# Patient Record
Sex: MALE | Race: ASIAN | NOT HISPANIC OR LATINO | URBAN - METROPOLITAN AREA
[De-identification: names, ages, dates, MRNs, and addresses within clinical notes are randomized per-mention and may not be internally consistent; named-entity substitution may affect disease eponyms.]

---

## 2021-09-30 ENCOUNTER — OUTPATIENT (OUTPATIENT)
Dept: OUTPATIENT SERVICES | Facility: HOSPITAL | Age: 76
LOS: 1 days | End: 2021-09-30
Payer: MEDICAID

## 2021-09-30 ENCOUNTER — APPOINTMENT (OUTPATIENT)
Dept: UROLOGY | Facility: CLINIC | Age: 76
End: 2021-09-30
Payer: MEDICAID

## 2021-09-30 DIAGNOSIS — Z78.9 OTHER SPECIFIED HEALTH STATUS: ICD-10-CM

## 2021-09-30 PROCEDURE — 76775 US EXAM ABDO BACK WALL LIM: CPT | Mod: 26

## 2021-09-30 PROCEDURE — 52000 CYSTOURETHROSCOPY: CPT

## 2021-09-30 PROCEDURE — 99204 OFFICE O/P NEW MOD 45 MIN: CPT | Mod: 25

## 2021-09-30 PROCEDURE — 76775 US EXAM ABDO BACK WALL LIM: CPT

## 2021-09-30 RX ORDER — FINASTERIDE 5 MG/1
TABLET, FILM COATED ORAL
Refills: 0 | Status: ACTIVE | COMMUNITY

## 2021-09-30 RX ORDER — ATORVASTATIN CALCIUM 80 MG/1
TABLET, FILM COATED ORAL
Refills: 0 | Status: ACTIVE | COMMUNITY

## 2021-09-30 RX ORDER — TAMSULOSIN HCL 0.4 MG
CAPSULE ORAL
Refills: 0 | Status: ACTIVE | COMMUNITY

## 2021-09-30 RX ORDER — DOCUSATE SODIUM 100 MG/1
100 CAPSULE ORAL
Refills: 0 | Status: ACTIVE | COMMUNITY

## 2021-09-30 RX ORDER — INSULIN LISPRO 100 [IU]/ML
INJECTION, SOLUTION INTRAVENOUS; SUBCUTANEOUS
Refills: 0 | Status: ACTIVE | COMMUNITY

## 2021-09-30 NOTE — HISTORY OF PRESENT ILLNESS
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

## 2021-09-30 NOTE — LETTER BODY
[FreeTextEntry1] : Farhad Barrera MD\par [address] [facility] [address], \[place] [address]\par [phone]\par \par Dear Dr. Barrera, \par \par Reason for visit: Gross hematuria.\par \par This is a 75 year-old Mandarin-speaking man on oxygen with stage III chronic renal insufficiency presenting with gross hematuria referred for evaluation. He is accompanied by his family member. The patient was recently hospitalized for inflammatory pneumonia. He underwent a lung biopsy.  He notes gross hematuria for the past 24 hours with no improvement. He denies any dysuria or urinary incontinence. The patient denies any aggravating or relieving factors. The patient denies any interference of function. The patient is entirely asymptomatic. All other review of systems are negative. He has no cancer in his family medical history. He has previous surgical history. Past medical history, family history and social history were inquired and were noncontributory to current condition. The patient does not use tobacco or drink alcohol. Medications and allergies were reviewed. He has no known allergies to medication. \par \par On examination, the patient is an elderly-appearing oxygen-dependent gentleman in no acute distress. He is alert and oriented follows commands. He has normal mood and affect. He is normocephalic. Neck is supple. Respirations are unlabored. His abdomen is soft and nontender. Bladder is nonpalpable. No CVA tenderness. Neurologically he is grossly intact. No peripheral edema. Skin without gross abnormality. He has normal male external genitalia. Normal meatus. Bilateral testes are descended intrascrotally and normal to palpation. On rectal examination, there is normal sphincter tone. The prostate is clinically benign without focal induration or nodularity.\par \par His BMP demonstrated decreased renal function, creatinine 1.36. \par \par Assessment: Gross hematuria.\par \par I counseled the patient on the various etiologies of gross hematuria. I discussed the risk of occult malignancy. He will also undergo renal ultrasound and cystoscopy to evaluate for hematuria.  I recommended patient obtain urinalysis and urine cytology. I counseled the patient about the procedures. I answered the patient's questions. The risks and expected outcomes were discussed.\par \par Plan: Cystoscopy. Renal ultrasound. Urinalysis. Urine cytology. \par \par His hematuria evaluation today demonstrated a bladder tumor on the left bladder trigone. \par \par I personally reviewed ultrasound images with the patient today and images demonstrated bilateral simple cysts. The largest cyst on the right measures 5.6 cm x 7.1 cm x 7.1 cm in the lateral upper pole. The largest cyst on the left measures 5.2 cm x 3.8 cm x 3.3 cm in the lateral upper pole. Both kidneys are normal in size and echogenicity without obvious stones, hydronephrosis or solid masses visualized. \par \par Assessment: Bladder tumor. \par \par I counseled the patient. His hematuria evaluation today demonstrated a bladder tumor on the left bladder trigone. I discussed with the patient the clinical significance of this finding.  I recommended the patient undergo TURBT. I counseled the patient regarding the procedure. The risks and benefits were discussed. Alternatives were given. I answered the patient questions. The patient will take the necessary preparations for the procedure. The patient will obtain preoperative labs including, BMP, CBC w/ DIFF, culture. Risks and alternatives were discussed. I answered the patient questions. The patient will follow-up as directed and will contact me with any questions or concerns. Thank you for the opportunity to participate in the care of Mr. AGUILAR. I will keep you updated on his progress. \par \par Plan: TURBT. Preoperative labs. Follow up as directed.

## 2021-10-02 LAB — BACTERIA UR CULT: NORMAL

## 2021-10-04 ENCOUNTER — NON-APPOINTMENT (OUTPATIENT)
Age: 76
End: 2021-10-04

## 2021-10-06 DIAGNOSIS — N18.30 CHRONIC KIDNEY DISEASE, STAGE 3 UNSPECIFIED: ICD-10-CM

## 2021-10-06 DIAGNOSIS — Z99.81 DEPENDENCE ON SUPPLEMENTAL OXYGEN: ICD-10-CM

## 2021-10-06 DIAGNOSIS — Z00.00 ENCOUNTER FOR GENERAL ADULT MEDICAL EXAMINATION WITHOUT ABNORMAL FINDINGS: ICD-10-CM

## 2021-10-06 DIAGNOSIS — R31.0 GROSS HEMATURIA: ICD-10-CM

## 2021-10-06 DIAGNOSIS — Z78.9 OTHER SPECIFIED HEALTH STATUS: ICD-10-CM

## 2021-10-06 DIAGNOSIS — Z87.01 PERSONAL HISTORY OF PNEUMONIA (RECURRENT): ICD-10-CM

## 2021-10-07 ENCOUNTER — OUTPATIENT (OUTPATIENT)
Dept: OUTPATIENT SERVICES | Facility: HOSPITAL | Age: 76
LOS: 1 days | End: 2021-10-07
Payer: MEDICAID

## 2021-10-07 VITALS
WEIGHT: 147.71 LBS | HEART RATE: 82 BPM | RESPIRATION RATE: 16 BRPM | HEIGHT: 68 IN | SYSTOLIC BLOOD PRESSURE: 123 MMHG | OXYGEN SATURATION: 97 % | DIASTOLIC BLOOD PRESSURE: 68 MMHG | TEMPERATURE: 97 F

## 2021-10-07 DIAGNOSIS — R31.0 GROSS HEMATURIA: ICD-10-CM

## 2021-10-07 DIAGNOSIS — Z00.00 ENCOUNTER FOR GENERAL ADULT MEDICAL EXAMINATION WITHOUT ABNORMAL FINDINGS: ICD-10-CM

## 2021-10-07 DIAGNOSIS — Z98.890 OTHER SPECIFIED POSTPROCEDURAL STATES: Chronic | ICD-10-CM

## 2021-10-07 DIAGNOSIS — N18.30 CHRONIC KIDNEY DISEASE, STAGE 3 UNSPECIFIED: ICD-10-CM

## 2021-10-07 DIAGNOSIS — E11.9 TYPE 2 DIABETES MELLITUS WITHOUT COMPLICATIONS: ICD-10-CM

## 2021-10-07 DIAGNOSIS — Z01.818 ENCOUNTER FOR OTHER PREPROCEDURAL EXAMINATION: ICD-10-CM

## 2021-10-07 DIAGNOSIS — J84.89 OTHER SPECIFIED INTERSTITIAL PULMONARY DISEASES: ICD-10-CM

## 2021-10-07 DIAGNOSIS — Z90.49 ACQUIRED ABSENCE OF OTHER SPECIFIED PARTS OF DIGESTIVE TRACT: Chronic | ICD-10-CM

## 2021-10-07 DIAGNOSIS — I10 ESSENTIAL (PRIMARY) HYPERTENSION: ICD-10-CM

## 2021-10-07 DIAGNOSIS — Z87.01 PERSONAL HISTORY OF PNEUMONIA (RECURRENT): ICD-10-CM

## 2021-10-07 LAB
ANION GAP SERPL CALC-SCNC: 11 MMOL/L — SIGNIFICANT CHANGE UP (ref 5–17)
BUN SERPL-MCNC: 50 MG/DL — HIGH (ref 7–23)
CALCIUM SERPL-MCNC: 8.8 MG/DL — SIGNIFICANT CHANGE UP (ref 8.4–10.5)
CHLORIDE SERPL-SCNC: 105 MMOL/L — SIGNIFICANT CHANGE UP (ref 96–108)
CO2 SERPL-SCNC: 20 MMOL/L — LOW (ref 22–31)
CREAT SERPL-MCNC: 1.63 MG/DL — HIGH (ref 0.5–1.3)
GLUCOSE SERPL-MCNC: 99 MG/DL — SIGNIFICANT CHANGE UP (ref 70–99)
HCT VFR BLD CALC: 33.7 % — LOW (ref 39–50)
HGB BLD-MCNC: 10.9 G/DL — LOW (ref 13–17)
MCHC RBC-ENTMCNC: 31.3 PG — SIGNIFICANT CHANGE UP (ref 27–34)
MCHC RBC-ENTMCNC: 32.3 GM/DL — SIGNIFICANT CHANGE UP (ref 32–36)
MCV RBC AUTO: 96.8 FL — SIGNIFICANT CHANGE UP (ref 80–100)
NRBC # BLD: 0 /100 WBCS — SIGNIFICANT CHANGE UP (ref 0–0)
PLATELET # BLD AUTO: 100 K/UL — LOW (ref 150–400)
POTASSIUM SERPL-MCNC: 5.2 MMOL/L — SIGNIFICANT CHANGE UP (ref 3.5–5.3)
POTASSIUM SERPL-SCNC: 5.2 MMOL/L — SIGNIFICANT CHANGE UP (ref 3.5–5.3)
RBC # BLD: 3.48 M/UL — LOW (ref 4.2–5.8)
RBC # FLD: 16.4 % — HIGH (ref 10.3–14.5)
SODIUM SERPL-SCNC: 136 MMOL/L — SIGNIFICANT CHANGE UP (ref 135–145)
WBC # BLD: 7.9 K/UL — SIGNIFICANT CHANGE UP (ref 3.8–10.5)
WBC # FLD AUTO: 7.9 K/UL — SIGNIFICANT CHANGE UP (ref 3.8–10.5)

## 2021-10-07 PROCEDURE — 80048 BASIC METABOLIC PNL TOTAL CA: CPT

## 2021-10-07 PROCEDURE — 87086 URINE CULTURE/COLONY COUNT: CPT

## 2021-10-07 PROCEDURE — G0463: CPT

## 2021-10-07 PROCEDURE — 85027 COMPLETE CBC AUTOMATED: CPT

## 2021-10-07 RX ORDER — SODIUM CHLORIDE 9 MG/ML
3 INJECTION INTRAMUSCULAR; INTRAVENOUS; SUBCUTANEOUS EVERY 8 HOURS
Refills: 0 | Status: DISCONTINUED | OUTPATIENT
Start: 2021-10-20 | End: 2021-11-03

## 2021-10-07 RX ORDER — LIDOCAINE HCL 20 MG/ML
0.2 VIAL (ML) INJECTION ONCE
Refills: 0 | Status: DISCONTINUED | OUTPATIENT
Start: 2021-10-20 | End: 2021-11-03

## 2021-10-07 RX ORDER — CEFAZOLIN SODIUM 1 G
2000 VIAL (EA) INJECTION ONCE
Refills: 0 | Status: DISCONTINUED | OUTPATIENT
Start: 2021-10-20 | End: 2021-11-03

## 2021-10-07 NOTE — H&P PST ADULT - ATTENDING COMMENTS
Patient with bladder lesion. Patient wishes to proceed with TURBT. Informed consent was obtained. Alternatives were given. Risks including but not limited to bleeding, infection, risk of anesthesia, pain, failure to cure, need for future procedure, and injury to surrounding structures were discussed. Patient wishes to proceed with procedure.

## 2021-10-07 NOTE — H&P PST ADULT - HISTORY OF PRESENT ILLNESS
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 Pt reports of gross hematuria for couple of weeks and denies any dysuria or urinary incontinence. history, family history and social history were inquired and were noncontributory to current condition. The patient does not use tobacco or drink alcohol. Medications and allergies were reviewed.    ****Of note Pt is currently on 2 L nasal cannula just for THOMAS, as per pt daughter, pulmonary appt and chest xray is on 10/14/21. Pt denies shortness of breath now  Denies Recent travel, Exposure or Covid symptoms  Covid PCR test on 10/17/21 75 year-old Mandarin-speaking man HTN, HLD,CKD-Stage3,Steroid induced diabetes- last AIC is 6.7 % on 09/2021, Hilar adenopathy, Organizing Pneumonia-on [id number]---[place] hospitalized @ [facility] 09/07/2021 for cough, fever and progressive SOB, s/p bronchoscopy and EBUS biopsy revealed no malignancy and showed organizing PNA, presented to PST for pre op clearance prior to Transurethral resection of bladder tumor on 10/20/21. Pt reports of gross hematuria for couple of weeks and denies any dysuria or urinary incontinence.     ****Of note Pt is currently on 2 L nasal cannula just for THOMAS, as per pt daughter,  scheduled for pulmonary appt and chest xray is on 10/14/21. Pt denies shortness of breath now.  Denies Recent travel, Exposure or Covid symptoms  Covid PCR test on 10/17/21

## 2021-10-07 NOTE — H&P PST ADULT - PROBLEM SELECTOR PLAN 1
scheduled for TURBT on 10/20/21  pre op instructions given  cbc, bmp urine culture sent  Instructed to continue meds &  take with sips of water in AM the day of surgery  continue levothyroxine

## 2021-10-07 NOTE — H&P PST ADULT - ACTIVITY
pt was very active prior to september, as per daughter he  has generalized fatigue now, uses rolling walker sometimes, able to climb 1-2 flights of stairs

## 2021-10-07 NOTE — H&P PST ADULT - NSANTHOSAYNRD_GEN_A_CORE
No. KELLIE screening performed.  STOP BANG Legend: 0-2 = LOW Risk; 3-4 = INTERMEDIATE Risk; 5-8 = HIGH Risk

## 2021-10-07 NOTE — H&P PST ADULT - NSICDXPASTMEDICALHX_GEN_ALL_CORE_FT
PAST MEDICAL HISTORY:  Anemia of chronic disease     DM (diabetes mellitus), type 2 steroid induced    Hematuria     Hilar adenopathy     Hypertension     Hypothyroidism     Organizing pneumonia     Oxygen dependent recently discharged 09/2021 from [facility]-- with 2 l nasal cannula    Pleural effusion 15 years ago    Stage 3 chronic kidney disease     TB (tuberculosis) treated 50 years ago

## 2021-10-07 NOTE — H&P PST ADULT - LAST STRESS TEST
Cyclic vomiting syndrome, intractability of vomiting not specified, presence of nausea not specified  09/15/2017    Active  Oscar Lenz 06/2021

## 2021-10-07 NOTE — H&P PST ADULT - NSICDXPASTSURGICALHX_GEN_ALL_CORE_FT
PAST SURGICAL HISTORY:  S/P bronchoscopy 09/14/21    S/P cholecystectomy     S/P hemorrhoidectomy

## 2021-10-09 LAB
CULTURE RESULTS: SIGNIFICANT CHANGE UP
SPECIMEN SOURCE: SIGNIFICANT CHANGE UP

## 2021-10-11 ENCOUNTER — APPOINTMENT (OUTPATIENT)
Dept: UROLOGY | Facility: CLINIC | Age: 76
End: 2021-10-11
Payer: MEDICAID

## 2021-10-11 PROCEDURE — 99214 OFFICE O/P EST MOD 30 MIN: CPT

## 2021-10-11 NOTE — ADDENDUM
[FreeTextEntry1] : Entered by Naveen Cortez, acting as scribe for Dr. Benny Rivas.\par \par The documentation recorded by the scribe accurately reflects the service I personally performed and the decisions made by me.

## 2021-10-11 NOTE — HISTORY OF PRESENT ILLNESS
[FreeTextEntry1] : Please refer to URO Consult note \par \par FUA hematuria and bladder tumor \par urine culture \par PSA 0.39

## 2021-10-11 NOTE — LETTER BODY
[FreeTextEntry1] : Farhad Barrera MD\par [address] [facility] [address], \[place] [address]\par [phone]\par \par Dear Dr. Barrera, \par \par Reason for visit: Gross hematuria.\par \par This is a 75 year-old Mandarin-speaking man on oxygen with stage III chronic renal insufficiency presenting with gross hematuria. He is accompanied by his family member. He recently underwent a lung biopsy after being hospitalized for inflammatory pneumonia. His hematuria evaluation demonstrated a bladder tumor on the left bladder trigone.  He returns today for follow up. Since he was last seen, he complains of dysuria.  He denies any urinary incontinence.  All other review of systems are negative. He is scheduled to undergo TURBT for his bladder tumor. Past medical history, family history and social history were inquired and were noncontributory to current condition. Medications and allergies were reviewed. He has no known allergies to medication. \par \par On examination, the patient is an elderly-appearing oxygen-dependent gentleman in no acute distress. He is alert and oriented follows commands. He has normal mood and affect. He is normocephalic. Neck is supple. Respirations are unlabored. His abdomen is soft and nontender. Bladder is nonpalpable. No CVA tenderness. Neurologically he is grossly intact. No peripheral edema. Skin without gross abnormality. He has normal male external genitalia. Normal meatus. Bilateral testes are descended intrascrotally and normal to palpation. On rectal examination, there is normal sphincter tone. The prostate is clinically benign without focal induration or nodularity.\par \par His PSA is 0.39, which is within normal limits. His urine culture was negative. \par \par Assessment: Gross hematuria. Bladder tumor. Dysuria. \par \par I counseled the patient. In terms of his dysuria, I recommended the patient repeat urine culture and obtain Chlamydia/GC amplification for further evaluation. In terms of his gross hematuria, I recommended the patient obtain urinalysis and urine cytology. In terms of his bladder tumor, the patient is scheduled to undergo TURBT. The patient will take the necessary preparations for the procedure. Risks and alternatives were discussed. I answered the patient questions. The patient will follow-up as directed and will contact me with any questions or concerns. Thank you for the opportunity to participate in the care of Mr. AGUILAR. I will keep you updated on his progress. \par \par Plan: Urinalysis. Urine culture. Urine cytology. Chlamydia/GC amplification.  Proceed with TURBT. Preoperative labs. Follow up as directed.

## 2021-10-12 LAB
APPEARANCE: ABNORMAL
BACTERIA UR CULT: NORMAL
BACTERIA: NEGATIVE
BILIRUBIN URINE: NEGATIVE
BLOOD URINE: ABNORMAL
C TRACH RRNA SPEC QL NAA+PROBE: NOT DETECTED
COLOR: YELLOW
GLUCOSE QUALITATIVE U: NEGATIVE
HYALINE CASTS: 0 /LPF
KETONES URINE: NEGATIVE
LEUKOCYTE ESTERASE URINE: NEGATIVE
MICROSCOPIC-UA: NORMAL
N GONORRHOEA RRNA SPEC QL NAA+PROBE: NOT DETECTED
NITRITE URINE: NEGATIVE
PH URINE: 6
PROTEIN URINE: ABNORMAL
RED BLOOD CELLS URINE: 293 /HPF
SOURCE AMPLIFICATION: NORMAL
SPECIFIC GRAVITY URINE: 1.02
SQUAMOUS EPITHELIAL CELLS: 0 /HPF
URINE CYTOLOGY: NORMAL
UROBILINOGEN URINE: NORMAL
WHITE BLOOD CELLS URINE: 2 /HPF

## 2021-10-14 ENCOUNTER — RESULT REVIEW (OUTPATIENT)
Age: 76
End: 2021-10-14

## 2021-10-15 PROBLEM — J84.89 OTHER SPECIFIED INTERSTITIAL PULMONARY DISEASES: Chronic | Status: ACTIVE | Noted: 2021-10-07

## 2021-10-15 PROBLEM — E03.9 HYPOTHYROIDISM, UNSPECIFIED: Chronic | Status: ACTIVE | Noted: 2021-10-07

## 2021-10-15 PROBLEM — Z99.81 DEPENDENCE ON SUPPLEMENTAL OXYGEN: Chronic | Status: ACTIVE | Noted: 2021-10-07

## 2021-10-15 PROBLEM — A15.9 RESPIRATORY TUBERCULOSIS UNSPECIFIED: Chronic | Status: ACTIVE | Noted: 2021-10-07

## 2021-10-15 PROBLEM — J90 PLEURAL EFFUSION, NOT ELSEWHERE CLASSIFIED: Chronic | Status: ACTIVE | Noted: 2021-10-07

## 2021-10-15 PROBLEM — R31.9 HEMATURIA, UNSPECIFIED: Chronic | Status: ACTIVE | Noted: 2021-10-07

## 2021-10-15 PROBLEM — E11.9 TYPE 2 DIABETES MELLITUS WITHOUT COMPLICATIONS: Chronic | Status: ACTIVE | Noted: 2021-10-07

## 2021-10-15 PROBLEM — N18.30 CHRONIC KIDNEY DISEASE, STAGE 3 UNSPECIFIED: Chronic | Status: ACTIVE | Noted: 2021-10-07

## 2021-10-15 PROBLEM — D63.8 ANEMIA IN OTHER CHRONIC DISEASES CLASSIFIED ELSEWHERE: Chronic | Status: ACTIVE | Noted: 2021-10-07

## 2021-10-15 PROBLEM — I10 ESSENTIAL (PRIMARY) HYPERTENSION: Chronic | Status: ACTIVE | Noted: 2021-10-07

## 2021-10-15 PROBLEM — R59.0 LOCALIZED ENLARGED LYMPH NODES: Chronic | Status: ACTIVE | Noted: 2021-10-07

## 2021-10-17 ENCOUNTER — OUTPATIENT (OUTPATIENT)
Dept: OUTPATIENT SERVICES | Facility: HOSPITAL | Age: 76
LOS: 1 days | End: 2021-10-17
Payer: MEDICAID

## 2021-10-17 DIAGNOSIS — Z90.49 ACQUIRED ABSENCE OF OTHER SPECIFIED PARTS OF DIGESTIVE TRACT: Chronic | ICD-10-CM

## 2021-10-17 DIAGNOSIS — Z11.52 ENCOUNTER FOR SCREENING FOR COVID-19: ICD-10-CM

## 2021-10-17 DIAGNOSIS — Z98.890 OTHER SPECIFIED POSTPROCEDURAL STATES: Chronic | ICD-10-CM

## 2021-10-17 LAB — SARS-COV-2 RNA SPEC QL NAA+PROBE: SIGNIFICANT CHANGE UP

## 2021-10-17 PROCEDURE — C9803: CPT

## 2021-10-17 PROCEDURE — U0005: CPT

## 2021-10-17 PROCEDURE — U0003: CPT

## 2021-10-19 ENCOUNTER — TRANSCRIPTION ENCOUNTER (OUTPATIENT)
Age: 76
End: 2021-10-19

## 2021-10-20 ENCOUNTER — RESULT REVIEW (OUTPATIENT)
Age: 76
End: 2021-10-20

## 2021-10-20 ENCOUNTER — APPOINTMENT (OUTPATIENT)
Dept: UROLOGY | Facility: HOSPITAL | Age: 76
End: 2021-10-20

## 2021-10-20 ENCOUNTER — OUTPATIENT (OUTPATIENT)
Dept: OUTPATIENT SERVICES | Facility: HOSPITAL | Age: 76
LOS: 1 days | End: 2021-10-20
Payer: MEDICAID

## 2021-10-20 VITALS
DIASTOLIC BLOOD PRESSURE: 65 MMHG | RESPIRATION RATE: 14 BRPM | TEMPERATURE: 98 F | OXYGEN SATURATION: 94 % | SYSTOLIC BLOOD PRESSURE: 142 MMHG | HEART RATE: 82 BPM

## 2021-10-20 VITALS
TEMPERATURE: 98 F | SYSTOLIC BLOOD PRESSURE: 116 MMHG | RESPIRATION RATE: 18 BRPM | OXYGEN SATURATION: 95 % | WEIGHT: 147.71 LBS | HEIGHT: 68 IN | HEART RATE: 84 BPM | DIASTOLIC BLOOD PRESSURE: 62 MMHG

## 2021-10-20 DIAGNOSIS — Z90.49 ACQUIRED ABSENCE OF OTHER SPECIFIED PARTS OF DIGESTIVE TRACT: Chronic | ICD-10-CM

## 2021-10-20 DIAGNOSIS — R31.0 GROSS HEMATURIA: ICD-10-CM

## 2021-10-20 DIAGNOSIS — Z98.890 OTHER SPECIFIED POSTPROCEDURAL STATES: Chronic | ICD-10-CM

## 2021-10-20 DIAGNOSIS — N18.30 CHRONIC KIDNEY DISEASE, STAGE 3 UNSPECIFIED: ICD-10-CM

## 2021-10-20 DIAGNOSIS — Z87.01 PERSONAL HISTORY OF PNEUMONIA (RECURRENT): ICD-10-CM

## 2021-10-20 DIAGNOSIS — Z00.00 ENCOUNTER FOR GENERAL ADULT MEDICAL EXAMINATION WITHOUT ABNORMAL FINDINGS: ICD-10-CM

## 2021-10-20 LAB
GLUCOSE BLDC GLUCOMTR-MCNC: 109 MG/DL — HIGH (ref 70–99)
GLUCOSE BLDC GLUCOMTR-MCNC: 115 MG/DL — HIGH (ref 70–99)

## 2021-10-20 PROCEDURE — 52240 CYSTOSCOPY AND TREATMENT: CPT

## 2021-10-20 PROCEDURE — 52235 CYSTOSCOPY AND TREATMENT: CPT

## 2021-10-20 PROCEDURE — 52351 CYSTOURETERO & OR PYELOSCOPE: CPT | Mod: LT,59

## 2021-10-20 PROCEDURE — 76000 FLUOROSCOPY <1 HR PHYS/QHP: CPT

## 2021-10-20 PROCEDURE — 52332 CYSTOSCOPY AND TREATMENT: CPT | Mod: LT,59

## 2021-10-20 PROCEDURE — 88305 TISSUE EXAM BY PATHOLOGIST: CPT | Mod: 26

## 2021-10-20 PROCEDURE — 88112 CYTOPATH CELL ENHANCE TECH: CPT | Mod: 26

## 2021-10-20 PROCEDURE — 88305 TISSUE EXAM BY PATHOLOGIST: CPT

## 2021-10-20 PROCEDURE — C2617: CPT

## 2021-10-20 PROCEDURE — C1758: CPT

## 2021-10-20 PROCEDURE — C1769: CPT

## 2021-10-20 PROCEDURE — 74420 UROGRAPHY RTRGR +-KUB: CPT | Mod: 26

## 2021-10-20 PROCEDURE — 52332 CYSTOSCOPY AND TREATMENT: CPT | Mod: LT

## 2021-10-20 PROCEDURE — 88112 CYTOPATH CELL ENHANCE TECH: CPT

## 2021-10-20 PROCEDURE — 82962 GLUCOSE BLOOD TEST: CPT

## 2021-10-20 RX ORDER — FINASTERIDE 5 MG/1
1 TABLET, FILM COATED ORAL
Qty: 0 | Refills: 0 | DISCHARGE

## 2021-10-20 RX ORDER — INSULIN LISPRO 100/ML
10 VIAL (ML) SUBCUTANEOUS
Qty: 0 | Refills: 0 | DISCHARGE

## 2021-10-20 RX ORDER — ONDANSETRON 8 MG/1
4 TABLET, FILM COATED ORAL ONCE
Refills: 0 | Status: DISCONTINUED | OUTPATIENT
Start: 2021-10-20 | End: 2021-10-20

## 2021-10-20 RX ORDER — TAMSULOSIN HYDROCHLORIDE 0.4 MG/1
1 CAPSULE ORAL
Qty: 0 | Refills: 0 | DISCHARGE

## 2021-10-20 RX ORDER — FAMOTIDINE 10 MG/ML
0 INJECTION INTRAVENOUS
Qty: 0 | Refills: 0 | DISCHARGE

## 2021-10-20 RX ORDER — HYDROMORPHONE HYDROCHLORIDE 2 MG/ML
0.2 INJECTION INTRAMUSCULAR; INTRAVENOUS; SUBCUTANEOUS
Refills: 0 | Status: DISCONTINUED | OUTPATIENT
Start: 2021-10-20 | End: 2021-10-20

## 2021-10-20 RX ORDER — FLUTICASONE PROPIONATE 50 MCG
1 SPRAY, SUSPENSION NASAL
Qty: 0 | Refills: 0 | DISCHARGE

## 2021-10-20 RX ORDER — ATORVASTATIN CALCIUM 80 MG/1
1 TABLET, FILM COATED ORAL
Qty: 0 | Refills: 0 | DISCHARGE

## 2021-10-20 RX ORDER — ENOXAPARIN SODIUM 100 MG/ML
10 INJECTION SUBCUTANEOUS
Qty: 0 | Refills: 0 | DISCHARGE

## 2021-10-20 RX ORDER — NIFEDIPINE 30 MG
1 TABLET, EXTENDED RELEASE 24 HR ORAL
Qty: 0 | Refills: 0 | DISCHARGE

## 2021-10-20 RX ORDER — METOPROLOL TARTRATE 50 MG
1 TABLET ORAL
Qty: 0 | Refills: 0 | DISCHARGE

## 2021-10-20 RX ORDER — LEVOTHYROXINE SODIUM 125 MCG
1 TABLET ORAL
Qty: 0 | Refills: 0 | DISCHARGE

## 2021-10-20 RX ORDER — SODIUM CHLORIDE 9 MG/ML
1000 INJECTION, SOLUTION INTRAVENOUS
Refills: 0 | Status: DISCONTINUED | OUTPATIENT
Start: 2021-10-20 | End: 2021-10-20

## 2021-10-20 RX ORDER — ENOXAPARIN SODIUM 100 MG/ML
8 INJECTION SUBCUTANEOUS
Qty: 0 | Refills: 0 | DISCHARGE

## 2021-10-20 RX ORDER — SODIUM CHLORIDE 9 MG/ML
1000 INJECTION, SOLUTION INTRAVENOUS
Refills: 0 | Status: DISCONTINUED | OUTPATIENT
Start: 2021-10-20 | End: 2021-11-03

## 2021-10-20 RX ORDER — DOCUSATE SODIUM 100 MG
0 CAPSULE ORAL
Qty: 0 | Refills: 0 | DISCHARGE

## 2021-10-20 RX ORDER — PREDNISOLONE 5 MG
0 TABLET ORAL
Qty: 0 | Refills: 0 | DISCHARGE

## 2021-10-20 RX ORDER — CHOLECALCIFEROL (VITAMIN D3) 125 MCG
1 CAPSULE ORAL
Qty: 0 | Refills: 0 | DISCHARGE

## 2021-10-20 NOTE — ASU DISCHARGE PLAN (ADULT/PEDIATRIC) - CARE PROVIDER_API CALL
Benny Rivas)  Urology  [address] [place] [address], [place] [address]  Phone: [phone]  Fax: [phone]  Follow Up Time:

## 2021-10-20 NOTE — ASU DISCHARGE PLAN (ADULT/PEDIATRIC) - ASU DC SPECIAL INSTRUCTIONSFT
You may take Tylenol and Ibuprofen over the counter every 6 hours for pain.     You will be discharged with the anderson in place, please follow up with Dr Rivas tomorrow. Please call the office to schedule your appointment.

## 2021-10-20 NOTE — BRIEF OPERATIVE NOTE - NSICDXBRIEFPROCEDURE_GEN_ALL_CORE_FT
PROCEDURES:  TURBT, with retrograde pyelogram 20-Oct-2021 16:44:46 left ureteroscopy and stent placment Meek Pardo

## 2021-10-21 ENCOUNTER — APPOINTMENT (OUTPATIENT)
Dept: UROLOGY | Facility: CLINIC | Age: 76
End: 2021-10-21
Payer: MEDICAID

## 2021-10-21 PROCEDURE — 99213 OFFICE O/P EST LOW 20 MIN: CPT

## 2021-10-21 NOTE — LETTER BODY
[FreeTextEntry1] : Farhad Barrera MD\par [address] [facility] [address], \[place] [address]\par [phone]\par \par Dear Dr. Barrera, \par \par Reason for visit: Gross hematuria. Bladder tumor. \par \par This is a 75 year-old Mandarin-speaking man on oxygen with stage III chronic renal insufficiency presenting with previous gross hematuria and bladder tumor s/p TURBT.  His hematuria evaluation demonstrated a bladder tumor on the left bladder trigone. The patient underwent uneventful TURBT yesterday. He has a left stent in place. He returns today for trial of void. Since he was last seen, he is doing well. He has no urinary complaints of difficulties.  All other review of systems are negative. Past medical history, family history and social history were inquired and were noncontributory to current condition. Medications and allergies were reviewed. He has no known allergies to medication. \par \par Patient was given a voiding trial today. The patient's bladder was filled with 300 cc of water. Patient was able to void spontaneously.\par \par Post-void residual on bladder scan today was 103 cc.\par \par Assessment: Gross hematuria. Bladder tumor. \par \par I counseled the patient. He passed his voiding trial today. His PVR was 103 cc.  The patient underwent uneventful TURBT. He has a left stent in place. I recommended the patient follow up in 1 week to discuss his pathology results. Patient understands that if he develops gross hematuria or any urinary discomfort, he will contact me for further evaluation. Risks and alternatives were discussed. I answered the patient questions. The patient will follow-up as directed and will contact me with any questions or concerns. Thank you for the opportunity to participate in the care of Mr. AGUILAR. I will keep you updated on his progress. \par \par Plan: Follow up in 1 week.

## 2021-10-22 LAB — NON-GYNECOLOGICAL CYTOLOGY STUDY: SIGNIFICANT CHANGE UP

## 2021-10-27 LAB — SURGICAL PATHOLOGY STUDY: SIGNIFICANT CHANGE UP

## 2021-10-28 ENCOUNTER — APPOINTMENT (OUTPATIENT)
Dept: UROLOGY | Facility: CLINIC | Age: 76
End: 2021-10-28
Payer: MEDICAID

## 2021-10-28 PROCEDURE — 99214 OFFICE O/P EST MOD 30 MIN: CPT

## 2021-10-28 NOTE — LETTER BODY
[FreeTextEntry1] : Farhad Barrera MD\par [address] [facility] [address], \[place] [address]\par [phone]\par \par Dear Dr. Barrera, \par \par Reason for visit: Gross hematuria. Bladder tumor. \par \par This is a 75 year-old Mandarin-speaking man on oxygen with stage III chronic renal insufficiency presenting with previous gross hematuria and bladder tumor s/p TURBT. His hematuria evaluation demonstrated a bladder tumor on the left bladder trigone. The patient underwent uneventful  transurethral resection of bladder tumor a week ago.  He has a left stent in place. He returns today to discuss his pathology results. 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 \par He has no urinary complaints of difficulties. He is overall doing well.  All other review of systems are negative. Past medical history, family history and social history were inquired and were noncontributory to current condition. Medications and allergies were reviewed. He has no known allergies to medication. \par \par TURBT demonstrated low grade noninvasive urothelial carcinoma. \par \par Assessment: Bladder cancer\par \par I counseled the patient on its clinical significance. I discussed the risk of occurrence and progression. I recommended patient consider intravesical Mitomycin C for six weeks.  I counseled the patient regarding the procedure. The risks and benefits were discussed. Alternatives were given. I answered the patient questions. The patient will take the necessary preparations for the procedure.  He has a left stent in place. He will follow up in 2 weeks for stent removal Patient understands that if he develops gross hematuria or any urinary discomfort, he will contact me for further evaluation. Risks and alternatives were discussed. I answered the patient questions. The patient will follow-up as directed and will contact me with any questions or concerns. Thank you for the opportunity to participate in the care of Mr. AGUILAR. I will keep you updated on his progress. \par \par Plan: Intravesical Mitomycin C. Follow up in 2 weeks for stent removal.

## 2021-10-28 NOTE — HISTORY OF PRESENT ILLNESS
[FreeTextEntry1] : Please refer to URO Consult note \par \par FUA TURBT \par pathology demonstrated low grade bladder cancer \par plan mitomycin \par come back in 2 weeks for stent removal

## 2021-11-11 ENCOUNTER — OUTPATIENT (OUTPATIENT)
Dept: OUTPATIENT SERVICES | Facility: HOSPITAL | Age: 76
LOS: 1 days | End: 2021-11-11
Payer: MEDICAID

## 2021-11-11 ENCOUNTER — APPOINTMENT (OUTPATIENT)
Dept: UROLOGY | Facility: CLINIC | Age: 76
End: 2021-11-11
Payer: MEDICAID

## 2021-11-11 ENCOUNTER — APPOINTMENT (OUTPATIENT)
Dept: MRI IMAGING | Facility: IMAGING CENTER | Age: 76
End: 2021-11-11
Payer: MEDICAID

## 2021-11-11 DIAGNOSIS — Z87.01 PERSONAL HISTORY OF PNEUMONIA (RECURRENT): ICD-10-CM

## 2021-11-11 DIAGNOSIS — N18.30 CHRONIC KIDNEY DISEASE, STAGE 3 UNSPECIFIED: ICD-10-CM

## 2021-11-11 DIAGNOSIS — Z98.890 OTHER SPECIFIED POSTPROCEDURAL STATES: Chronic | ICD-10-CM

## 2021-11-11 DIAGNOSIS — R31.0 GROSS HEMATURIA: ICD-10-CM

## 2021-11-11 DIAGNOSIS — Z90.49 ACQUIRED ABSENCE OF OTHER SPECIFIED PARTS OF DIGESTIVE TRACT: Chronic | ICD-10-CM

## 2021-11-11 DIAGNOSIS — Z00.00 ENCOUNTER FOR GENERAL ADULT MEDICAL EXAMINATION WITHOUT ABNORMAL FINDINGS: ICD-10-CM

## 2021-11-11 DIAGNOSIS — R35.0 FREQUENCY OF MICTURITION: ICD-10-CM

## 2021-11-11 PROCEDURE — A9585: CPT

## 2021-11-11 PROCEDURE — 88112 CYTOPATH CELL ENHANCE TECH: CPT | Mod: 26

## 2021-11-11 PROCEDURE — 74183 MRI ABD W/O CNTR FLWD CNTR: CPT

## 2021-11-11 PROCEDURE — 52310 CYSTOSCOPY AND TREATMENT: CPT

## 2021-11-11 PROCEDURE — 72197 MRI PELVIS W/O & W/DYE: CPT

## 2021-11-11 PROCEDURE — 74183 MRI ABD W/O CNTR FLWD CNTR: CPT | Mod: 26

## 2021-11-11 PROCEDURE — 72197 MRI PELVIS W/O & W/DYE: CPT | Mod: 26

## 2021-12-02 ENCOUNTER — OUTPATIENT (OUTPATIENT)
Dept: OUTPATIENT SERVICES | Facility: HOSPITAL | Age: 76
LOS: 1 days | End: 2021-12-02
Payer: MEDICAID

## 2021-12-02 ENCOUNTER — APPOINTMENT (OUTPATIENT)
Dept: UROLOGY | Facility: CLINIC | Age: 76
End: 2021-12-02
Payer: MEDICAID

## 2021-12-02 VITALS — SYSTOLIC BLOOD PRESSURE: 149 MMHG | HEART RATE: 85 BPM | DIASTOLIC BLOOD PRESSURE: 76 MMHG | RESPIRATION RATE: 16 BRPM

## 2021-12-02 VITALS
SYSTOLIC BLOOD PRESSURE: 119 MMHG | HEART RATE: 85 BPM | DIASTOLIC BLOOD PRESSURE: 70 MMHG | RESPIRATION RATE: 16 BRPM | TEMPERATURE: 97.8 F

## 2021-12-02 DIAGNOSIS — Z99.81 DEPENDENCE ON SUPPLEMENTAL OXYGEN: ICD-10-CM

## 2021-12-02 DIAGNOSIS — R35.0 FREQUENCY OF MICTURITION: ICD-10-CM

## 2021-12-02 DIAGNOSIS — Z98.890 OTHER SPECIFIED POSTPROCEDURAL STATES: Chronic | ICD-10-CM

## 2021-12-02 DIAGNOSIS — Z90.49 ACQUIRED ABSENCE OF OTHER SPECIFIED PARTS OF DIGESTIVE TRACT: Chronic | ICD-10-CM

## 2021-12-02 PROCEDURE — 51720 TREATMENT OF BLADDER LESION: CPT

## 2021-12-02 RX ORDER — MITOMYCIN 40 MG/80ML
40 INJECTION, POWDER, LYOPHILIZED, FOR SOLUTION INTRAVENOUS
Qty: 1 | Refills: 0 | Status: COMPLETED | OUTPATIENT
Start: 2021-12-02 | End: 2021-12-02

## 2021-12-02 RX ORDER — MITOMYCIN 5 MG/10ML
40 INJECTION, POWDER, LYOPHILIZED, FOR SOLUTION INTRAVENOUS ONCE
Refills: 0 | Status: DISCONTINUED | OUTPATIENT
Start: 2021-12-02 | End: 2021-12-16

## 2021-12-02 RX ORDER — MITOMYCIN 40 MG/80ML
40 INJECTION, POWDER, LYOPHILIZED, FOR SOLUTION INTRAVENOUS
Qty: 1 | Refills: 0 | Status: COMPLETED | COMMUNITY
Start: 2021-10-28 | End: 2021-12-02

## 2021-12-02 RX ORDER — MITOMYCIN 40 MG/80ML
40 INJECTION, POWDER, LYOPHILIZED, FOR SOLUTION INTRAVENOUS
Qty: 1 | Refills: 0 | Status: COMPLETED | OUTPATIENT
Start: 2021-12-02

## 2021-12-02 RX ADMIN — MITOMYCIN 0 MG: 40 INJECTION, POWDER, LYOPHILIZED, FOR SOLUTION INTRAVENOUS at 00:00

## 2021-12-04 DIAGNOSIS — R31.0 GROSS HEMATURIA: ICD-10-CM

## 2021-12-04 DIAGNOSIS — N18.30 CHRONIC KIDNEY DISEASE, STAGE 3 UNSPECIFIED: ICD-10-CM

## 2021-12-04 DIAGNOSIS — Z87.01 PERSONAL HISTORY OF PNEUMONIA (RECURRENT): ICD-10-CM

## 2021-12-04 DIAGNOSIS — Z99.81 DEPENDENCE ON SUPPLEMENTAL OXYGEN: ICD-10-CM

## 2021-12-04 DIAGNOSIS — Z00.00 ENCOUNTER FOR GENERAL ADULT MEDICAL EXAMINATION WITHOUT ABNORMAL FINDINGS: ICD-10-CM

## 2021-12-09 ENCOUNTER — APPOINTMENT (OUTPATIENT)
Dept: UROLOGY | Facility: CLINIC | Age: 76
End: 2021-12-09

## 2021-12-09 ENCOUNTER — NON-APPOINTMENT (OUTPATIENT)
Age: 76
End: 2021-12-09

## 2021-12-16 ENCOUNTER — APPOINTMENT (OUTPATIENT)
Dept: UROLOGY | Facility: CLINIC | Age: 76
End: 2021-12-16

## 2021-12-16 VITALS
HEART RATE: 83 BPM | TEMPERATURE: 98.2 F | RESPIRATION RATE: 16 BRPM | SYSTOLIC BLOOD PRESSURE: 151 MMHG | DIASTOLIC BLOOD PRESSURE: 71 MMHG

## 2021-12-16 DIAGNOSIS — N18.30 CHRONIC KIDNEY DISEASE, STAGE 3 UNSPECIFIED: ICD-10-CM

## 2021-12-22 ENCOUNTER — APPOINTMENT (OUTPATIENT)
Dept: UROLOGY | Facility: CLINIC | Age: 76
End: 2021-12-22

## 2022-01-07 ENCOUNTER — APPOINTMENT (OUTPATIENT)
Dept: UROLOGY | Facility: CLINIC | Age: 77
End: 2022-01-07
Payer: MEDICAID

## 2022-01-07 VITALS
OXYGEN SATURATION: 94 % | HEART RATE: 99 BPM | WEIGHT: 155.5 LBS | SYSTOLIC BLOOD PRESSURE: 148 MMHG | RESPIRATION RATE: 18 BRPM | DIASTOLIC BLOOD PRESSURE: 79 MMHG | TEMPERATURE: 97.8 F

## 2022-01-07 PROCEDURE — 99214 OFFICE O/P EST MOD 30 MIN: CPT

## 2022-01-07 NOTE — HISTORY OF PRESENT ILLNESS
[FreeTextEntry1] : P patient has history of noninvasive low-grade bladder cancer\par \par Patient was scheduled for intravesical mitomycin. Unfortunately patient has developed complications from his pulmonary disease and requires chronic steroids.\par \par Patient is concerned about the risk of infection which declined intravesical therapy. I encouraged him to return in 1 month's time for cystoscopy. Risks and alternatives were discussed. I answered the patient questions. The patient will follow-up as directed and will contact me with any questions or concerns.

## 2022-01-12 ENCOUNTER — APPOINTMENT (OUTPATIENT)
Dept: UROLOGY | Facility: CLINIC | Age: 77
End: 2022-01-12

## 2022-02-04 ENCOUNTER — APPOINTMENT (OUTPATIENT)
Dept: UROLOGY | Facility: CLINIC | Age: 77
End: 2022-02-04
Payer: MEDICAID

## 2022-02-04 VITALS
HEART RATE: 87 BPM | OXYGEN SATURATION: 96 % | TEMPERATURE: 98.2 F | RESPIRATION RATE: 18 BRPM | WEIGHT: 160 LBS | DIASTOLIC BLOOD PRESSURE: 80 MMHG | SYSTOLIC BLOOD PRESSURE: 136 MMHG

## 2022-02-04 PROCEDURE — 52000 CYSTOURETHROSCOPY: CPT

## 2022-02-04 RX ORDER — CIPROFLOXACIN HYDROCHLORIDE 500 MG/1
500 TABLET, FILM COATED ORAL
Refills: 0 | Status: COMPLETED | OUTPATIENT
Start: 2022-02-04

## 2022-02-07 LAB — URINE CYTOLOGY: NORMAL

## 2022-05-09 ENCOUNTER — OUTPATIENT (OUTPATIENT)
Dept: OUTPATIENT SERVICES | Facility: HOSPITAL | Age: 77
LOS: 1 days | End: 2022-05-09
Payer: MEDICAID

## 2022-05-09 ENCOUNTER — APPOINTMENT (OUTPATIENT)
Dept: UROLOGY | Facility: CLINIC | Age: 77
End: 2022-05-09
Payer: MEDICAID

## 2022-05-09 DIAGNOSIS — Z98.890 OTHER SPECIFIED POSTPROCEDURAL STATES: Chronic | ICD-10-CM

## 2022-05-09 DIAGNOSIS — Z90.49 ACQUIRED ABSENCE OF OTHER SPECIFIED PARTS OF DIGESTIVE TRACT: Chronic | ICD-10-CM

## 2022-05-09 DIAGNOSIS — R35.0 FREQUENCY OF MICTURITION: ICD-10-CM

## 2022-05-09 DIAGNOSIS — R31.0 GROSS HEMATURIA: ICD-10-CM

## 2022-05-09 DIAGNOSIS — C67.0 MALIGNANT NEOPLASM OF TRIGONE OF BLADDER: ICD-10-CM

## 2022-05-09 DIAGNOSIS — Z87.01 PERSONAL HISTORY OF PNEUMONIA (RECURRENT): ICD-10-CM

## 2022-05-09 PROCEDURE — 52000 CYSTOURETHROSCOPY: CPT

## 2022-05-09 PROCEDURE — 88112 CYTOPATH CELL ENHANCE TECH: CPT | Mod: 26

## 2022-05-10 LAB — URINE CYTOLOGY: NORMAL

## 2022-06-23 PROBLEM — J84.89 ORGANIZING PNEUMONIA: Status: ACTIVE | Noted: 2022-06-23

## 2022-06-23 PROBLEM — N18.31 CHRONIC RENAL IMPAIRMENT, STAGE 3A: Status: ACTIVE | Noted: 2022-06-23

## 2022-06-23 PROBLEM — Z86.11 HISTORY OF TUBERCULOSIS: Status: RESOLVED | Noted: 2022-06-23 | Resolved: 2022-06-23

## 2022-06-25 ENCOUNTER — OUTPATIENT (OUTPATIENT)
Dept: OUTPATIENT SERVICES | Facility: HOSPITAL | Age: 77
LOS: 1 days | Discharge: HOME | End: 2022-06-25

## 2022-06-25 DIAGNOSIS — Z90.49 ACQUIRED ABSENCE OF OTHER SPECIFIED PARTS OF DIGESTIVE TRACT: Chronic | ICD-10-CM

## 2022-06-25 DIAGNOSIS — Z98.890 OTHER SPECIFIED POSTPROCEDURAL STATES: Chronic | ICD-10-CM

## 2022-06-25 DIAGNOSIS — C67.0 MALIGNANT NEOPLASM OF TRIGONE OF BLADDER: ICD-10-CM

## 2022-06-28 ENCOUNTER — RESULT REVIEW (OUTPATIENT)
Age: 77
End: 2022-06-28

## 2022-06-28 ENCOUNTER — APPOINTMENT (OUTPATIENT)
Dept: HEMATOLOGY ONCOLOGY | Facility: CLINIC | Age: 77
End: 2022-06-28
Payer: MEDICAID

## 2022-06-28 ENCOUNTER — NON-APPOINTMENT (OUTPATIENT)
Age: 77
End: 2022-06-28

## 2022-06-28 ENCOUNTER — LABORATORY RESULT (OUTPATIENT)
Age: 77
End: 2022-06-28

## 2022-06-28 VITALS
TEMPERATURE: 97.7 F | OXYGEN SATURATION: 96 % | HEIGHT: 67.32 IN | HEART RATE: 74 BPM | SYSTOLIC BLOOD PRESSURE: 120 MMHG | BODY MASS INDEX: 26.03 KG/M2 | WEIGHT: 167.77 LBS | RESPIRATION RATE: 18 BRPM | DIASTOLIC BLOOD PRESSURE: 64 MMHG

## 2022-06-28 DIAGNOSIS — Z80.52 FAMILY HISTORY OF MALIGNANT NEOPLASM OF BLADDER: ICD-10-CM

## 2022-06-28 DIAGNOSIS — N18.31 CHRONIC KIDNEY DISEASE, STAGE 3A: ICD-10-CM

## 2022-06-28 DIAGNOSIS — Z80.0 FAMILY HISTORY OF MALIGNANT NEOPLASM OF DIGESTIVE ORGANS: ICD-10-CM

## 2022-06-28 DIAGNOSIS — E78.5 HYPERLIPIDEMIA, UNSPECIFIED: ICD-10-CM

## 2022-06-28 DIAGNOSIS — J84.89 OTHER SPECIFIED INTERSTITIAL PULMONARY DISEASES: ICD-10-CM

## 2022-06-28 DIAGNOSIS — Z83.3 FAMILY HISTORY OF DIABETES MELLITUS: ICD-10-CM

## 2022-06-28 DIAGNOSIS — E03.9 HYPOTHYROIDISM, UNSPECIFIED: ICD-10-CM

## 2022-06-28 DIAGNOSIS — Z86.11 PERSONAL HISTORY OF TUBERCULOSIS: ICD-10-CM

## 2022-06-28 LAB
BASOPHILS # BLD AUTO: 0.03 K/UL — SIGNIFICANT CHANGE UP (ref 0–0.2)
BASOPHILS NFR BLD AUTO: 0.5 % — SIGNIFICANT CHANGE UP (ref 0–2)
EOSINOPHIL # BLD AUTO: 0.08 K/UL — SIGNIFICANT CHANGE UP (ref 0–0.5)
EOSINOPHIL NFR BLD AUTO: 1.2 % — SIGNIFICANT CHANGE UP (ref 0–6)
HCT VFR BLD CALC: 32.4 % — LOW (ref 39–50)
HGB BLD-MCNC: 10.6 G/DL — LOW (ref 13–17)
IMM GRANULOCYTES NFR BLD AUTO: 0.5 % — SIGNIFICANT CHANGE UP (ref 0–1.5)
LYMPHOCYTES # BLD AUTO: 0.74 K/UL — LOW (ref 1–3.3)
LYMPHOCYTES # BLD AUTO: 11.2 % — LOW (ref 13–44)
MCHC RBC-ENTMCNC: 30.4 PG — SIGNIFICANT CHANGE UP (ref 27–34)
MCHC RBC-ENTMCNC: 32.7 G/DL — SIGNIFICANT CHANGE UP (ref 32–36)
MCV RBC AUTO: 92.8 FL — SIGNIFICANT CHANGE UP (ref 80–100)
MONOCYTES # BLD AUTO: 0.58 K/UL — SIGNIFICANT CHANGE UP (ref 0–0.9)
MONOCYTES NFR BLD AUTO: 8.7 % — SIGNIFICANT CHANGE UP (ref 2–14)
NEUTROPHILS # BLD AUTO: 5.17 K/UL — SIGNIFICANT CHANGE UP (ref 1.8–7.4)
NEUTROPHILS NFR BLD AUTO: 77.9 % — HIGH (ref 43–77)
NRBC # BLD: 0 /100 WBCS — SIGNIFICANT CHANGE UP (ref 0–0)
PLATELET # BLD AUTO: 157 K/UL — SIGNIFICANT CHANGE UP (ref 150–400)
RBC # BLD: 3.49 M/UL — LOW (ref 4.2–5.8)
RBC # FLD: 14 % — SIGNIFICANT CHANGE UP (ref 10.3–14.5)
RETICS #: 69.8 K/UL — SIGNIFICANT CHANGE UP (ref 25–125)
RETICS/RBC NFR: 2 % — SIGNIFICANT CHANGE UP (ref 0.5–2.5)
WBC # BLD: 6.63 K/UL — SIGNIFICANT CHANGE UP (ref 3.8–10.5)
WBC # FLD AUTO: 6.63 K/UL — SIGNIFICANT CHANGE UP (ref 3.8–10.5)

## 2022-06-28 PROCEDURE — 99205 OFFICE O/P NEW HI 60 MIN: CPT

## 2022-06-28 RX ORDER — CIPROFLOXACIN HYDROCHLORIDE 500 MG/1
500 TABLET, FILM COATED ORAL
Qty: 1 | Refills: 0 | Status: DISCONTINUED | COMMUNITY
Start: 2022-02-04 | End: 2022-06-28

## 2022-06-28 RX ORDER — METOPROLOL SUCCINATE 25 MG/1
25 TABLET, EXTENDED RELEASE ORAL
Qty: 30 | Refills: 0 | Status: ACTIVE | COMMUNITY
Start: 2022-04-18

## 2022-06-28 RX ORDER — PREDNISONE 5 MG/1
5 TABLET ORAL
Qty: 30 | Refills: 0 | Status: ACTIVE | COMMUNITY
Start: 2022-06-02

## 2022-06-28 RX ORDER — METOPROLOL TARTRATE 75 MG/1
TABLET, FILM COATED ORAL
Refills: 0 | Status: DISCONTINUED | COMMUNITY
End: 2022-06-28

## 2022-06-28 RX ORDER — INSULIN LISPRO 100 U/ML
100 INJECTION, SOLUTION SUBCUTANEOUS
Qty: 18 | Refills: 0 | Status: ACTIVE | COMMUNITY
Start: 2022-01-11

## 2022-06-28 RX ORDER — CHLORTHALIDONE 25 MG/1
25 TABLET ORAL
Qty: 30 | Refills: 0 | Status: ACTIVE | COMMUNITY
Start: 2022-03-14

## 2022-06-28 RX ORDER — MUPIROCIN 20 MG/G
2 OINTMENT TOPICAL
Qty: 22 | Refills: 0 | Status: ACTIVE | COMMUNITY
Start: 2022-06-21

## 2022-06-28 RX ORDER — INSULIN GLARGINE-YFGN 100 [IU]/ML
100 INJECTION, SOLUTION SUBCUTANEOUS
Qty: 6 | Refills: 0 | Status: ACTIVE | COMMUNITY
Start: 2022-02-09

## 2022-06-28 RX ORDER — BISACODYL 5 MG/1
5 TABLET ORAL
Qty: 4 | Refills: 0 | Status: ACTIVE | COMMUNITY
Start: 2022-01-11

## 2022-06-28 RX ORDER — NIFEDIPINE 60 MG/1
60 TABLET, FILM COATED, EXTENDED RELEASE ORAL
Qty: 60 | Refills: 0 | Status: ACTIVE | COMMUNITY
Start: 2022-05-31

## 2022-06-28 RX ORDER — NIFEDIPINE 60 MG/1
60 TABLET, EXTENDED RELEASE ORAL
Qty: 60 | Refills: 0 | Status: ACTIVE | COMMUNITY
Start: 2022-02-09

## 2022-06-28 RX ORDER — MITOMYCIN 40 MG/80ML
40 INJECTION, POWDER, LYOPHILIZED, FOR SOLUTION INTRAVENOUS
Qty: 1 | Refills: 0 | Status: DISCONTINUED | OUTPATIENT
Start: 2021-12-09 | End: 2022-06-28

## 2022-06-28 RX ORDER — ESOMEPRAZOLE MAGNESIUM 40 MG/1
40 CAPSULE, DELAYED RELEASE ORAL
Qty: 30 | Refills: 0 | Status: ACTIVE | COMMUNITY
Start: 2022-06-25

## 2022-06-28 RX ORDER — METOPROLOL SUCCINATE 50 MG/1
50 TABLET, EXTENDED RELEASE ORAL
Qty: 30 | Refills: 0 | Status: ACTIVE | COMMUNITY
Start: 2022-02-09

## 2022-06-28 RX ORDER — LEVOTHYROXINE SODIUM 0.05 MG/1
50 TABLET ORAL
Qty: 30 | Refills: 0 | Status: ACTIVE | COMMUNITY
Start: 2022-02-09

## 2022-06-28 RX ORDER — OMEPRAZOLE 40 MG/1
40 CAPSULE, DELAYED RELEASE ORAL
Qty: 30 | Refills: 0 | Status: DISCONTINUED | COMMUNITY
Start: 2022-04-18

## 2022-06-28 RX ORDER — AMPICILLIN TRIHYDRATE 500 MG
25 MCG CAPSULE ORAL
Qty: 30 | Refills: 0 | Status: ACTIVE | COMMUNITY
Start: 2022-01-30

## 2022-06-28 RX ORDER — MITOMYCIN 40 MG/80ML
40 INJECTION, POWDER, LYOPHILIZED, FOR SOLUTION INTRAVENOUS
Qty: 1 | Refills: 0 | Status: DISCONTINUED | OUTPATIENT
Start: 2021-12-16 | End: 2022-06-28

## 2022-06-28 RX ORDER — FAMOTIDINE 20 MG/1
20 TABLET, FILM COATED ORAL
Qty: 60 | Refills: 0 | Status: DISCONTINUED | COMMUNITY
Start: 2022-01-07

## 2022-06-28 RX ORDER — BENZONATATE 100 MG/1
100 CAPSULE ORAL
Qty: 90 | Refills: 0 | Status: ACTIVE | COMMUNITY
Start: 2022-01-07

## 2022-06-28 RX ORDER — MULTIVITAMIN
TABLET ORAL
Qty: 30 | Refills: 0 | Status: ACTIVE | COMMUNITY
Start: 2022-02-09

## 2022-06-28 RX ORDER — POLYETHYLENE GLYCOL 3350 17 G/17G
17 POWDER, FOR SOLUTION ORAL
Qty: 238 | Refills: 0 | Status: ACTIVE | COMMUNITY
Start: 2022-01-11

## 2022-06-28 RX ORDER — LEVOTHYROXINE SODIUM 300 UG/1
TABLET ORAL
Refills: 0 | Status: DISCONTINUED | COMMUNITY
End: 2022-06-28

## 2022-06-28 NOTE — RESULTS/DATA
[FreeTextEntry1] : Final Diagnosis\par 1. Bladder, tumor, TUR\par      -Histologic Type:  Non-invasive urothelial carcinoma \par      -Histologic Grade:  Low grade   \par      -Pattern of growth of the non-invasive component:  Papillary \par      -Local Invasion:  Not identified \par      -Muscularis Propria:  Muscularis propria (detrusor muscle) is not identified\par      -Lymphovascular Invasion:  Not identified \par \par \par 2. Bladder, left ureteral orifice, TUR\par      -Histologic Type:  Non-invasive urothelial carcinoma \par      -Histologic Grade:  Low grade   \par      -Pattern of growth of the non-invasive component:  Papillary \par      -Local Invasion:  Not identified \par      -Muscularis Propria:  Muscularis propria (detrusor muscle) is present and not involved\par      -Lymphovascular Invasion:  Not identified \par \par 3. Bladder, base of  tumor, TUR\par      -Muscularis propria (detrusor muscle) is present and not involved. \par \par \par Verified by: Rani Bruno MD\par (Electronic Signature)\par Reported on: 10/27/21\par ***************************************\par EXAM: MR ABDOMEN WAW IC\par EXAM: MR PELVIS WAW IC\par PROCEDURE DATE: 11/11/2021\par INTERPRETATION: CLINICAL INFORMATION: Bladder tumor extending from the left trigone of the left lateral and posterior bladder walls and involving the distal left ureter status post TURBT and left ureteral stent placement (10/20/2021) followed by stent removal (11/11/2021).\par COMPARISON: None.\par \par CONTRAST/COMPLICATIONS:\par IV Contrast: Gadavist 7.5 cc administered 0 cc discarded\par Oral Contrast: NONE\par Complications: None reported at time of study completion\par \par PROCEDURE:\par MRI of the abdomen and pelvis was performed as per MR Urogram protocol.\par 10 mg Lasix administered IV. Levsin 0.25 mg administered sublingual.\par \par FINDINGS:\par LOWER CHEST: Bandlike signal abnormality at the lung bases may reflect subsegmental atelectasis. More rounded focus in the right lower lobe measuring 1.2 cm (series 35 image 12) could reflect a pulmonary nodule.\par \par LIVER: Normal size and morphology. Numerous cysts scattered throughout the liver, the largest a thinly septated cyst in the central right lobe measuring 7.4 x 4.8 cm (series 11 image 15). No suspicious liver lesion. No steatosis.\par BILE DUCTS: Normal caliber.\par GALLBLADDER: Cholecystectomy.\par SPLEEN: Within normal limits.\par PANCREAS: Within normal limits.\par ADRENALS: Within normal limits.\par KIDNEYS/URETERS/BLADDER: Numerous renal cysts bilaterally, the largest on the right measuring 7.2 x 5.9 cm and the largest on the left measuring 3.9 x 3.7 cm. No suspicious renal mass. No hydronephrosis.\par \par The renal collecting systems and ureters do not well-opacify during the excretory phase despite the administration of Lasix, potentially related to the patient's chronic kidney disease. Wall thickening and hyperenhancement along the left posterior lateral bladder wall (series 39 image 60). Hyperenhancement extends to involve the left ureter (series 3 image 58), extending proximally up to the left renal pelvis (series 35 image 64).\par \par REPRODUCTIVE ORGANS: Prostate is normal in size.\par \par BOWEL: No bowel obstruction.\par PERITONEUM: No ascites.\par VESSELS: Atherosclerotic changes.\par RETROPERITONEUM/LYMPH NODES: No lymphadenopathy.\par ABDOMINAL WALL: Post surgical changes in the anterior abdominal wall.\par BONES: Degenerative changes of the spine.\par \par IMPRESSION:\par Wall thickening and hyperenhancement along the left posterior lateral aspect of the urinary bladder, which may be inflammatory/post procedural in etiology, though residual neoplasm is not excluded. Continued attention on cystoscopy and follow-up imaging is recommended.\par \par Hyperenhancement extends to involve the left ureter, extending proximally to the left renal pelvis. Again, findings may be inflammatory/postprocedural in etiology, noting that the patient's ureteral stent was removed earlier on the same day of this exam, though neoplasm is not entirely excluded. Continued attention on follow-up is recommended.\par \par Subsegmental atelectatic changes at the lung bases. A more rounded focus of signal abnormality in the right lower lobe measuring 1.2 cm could reflect atelectasis versus a pulmonary nodule. Consider further assessment with dedicated chest CT.\par --- End of Report ---\par GERDA ANDERSON MD; Attending Radiologist\par This document has been electronically signed. Nov 16 2021 4:00PM\par *****************************************

## 2022-06-28 NOTE — ASSESSMENT
[Palliative Care Plan] : not applicable at this time [FreeTextEntry1] : Mr. Alarcon was seen in the office today in consultation, referred by his primary care doctor Dr. Harris.  He presented with his daughter who is a nurse practitioner.  She served as a font of information as well as an .  I had multiple office notes including his pulmonologist's evaluation.  He was admitted in September at [facility] with organizing pneumonia.  He was treated with steroids.  He has a history of prior TB treated in [place] more than 50 years ago.  He had 2 admissions while in [place] for respiratory issues which were treated with steroids and intravenous immunoglobulin according to his daughter.  He has been living in [place] for 2 years.  There was a 2-week illness back in September with fevers and chills associated with a dry cough.  He had markedly abnormal bilateral nodular densities on the CT scan which were more pronounced at the bases.  There is no hemoptysis.  He was treated with intravenous antibiotics.  He has a history of stage III chronic renal disease, present for many years according to his daughter.  He has a history of hypertension as well.  He was placed on isolation initially he underwent a bronchoscopy and EBUS biopsy.  There was no evidence of malignancy, but he did have organizing pneumonia.  He required long-term steroids for hypoxic respiratory failure.  He was not intubated.  Once stable on nasal cannula, he was discharged home.  He uses his nasal cannula on occasion, mostly at night.  His discharge creatinine was 1.7 with a hemoglobin of 9.5 g and normal indices.  The platelet count was normal.  He had hematuria during that admission he was seen by my colleague, Dr. Benny Rivas.  He underwent a cystoscopy.  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  He had a transurethral resection and a left ureteral stent placement.  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  He would be considered intermediate risk because it was multifocal disease.  He was planned to receive mitomycin instillation, and received 1 instillation, but his pneumonia flared after the mitomycin.  Mitomycin does have pulmonary toxicity as 1 of its adverse effects, however I do not believe that significant amounts of mitomycin would have been absorbed by intravesical instillation for a relatively short time.  Nonetheless, no further mitomycin was administered.  He has had repeat cystoscopies, and a recent cystoscopy revealed a questionable area and he is due to have a transurethral resection performed with Dr. Rivas in August.  He has a follow-up appointment with Dr. Wolfe, his pulmonologist, next week.  He is only on 2-1/2 mg of prednisone every other day at this point.  He complains of a lot of fatigue.  He also has hypothyroidism and notes chills.  He comes dressed today to the office and a sweater vest.  His urinary flow is interrupted and sometimes weak.  Nocturia occurs 2-3 times.  There is occasional incontinence with urgency.  He has urgency for bowel movements as well and has 2 or 3 bowel movements per day which are soft and formed without blood.  He is mostly independent in his activities of daily living, and he does need some assistance in dressing during the winter as he wears increased amounts of clothing.\par \par A comprehensive history was obtained and a physical examination was performed.  Various documents were reviewed.  Imaging studies were not available for review.\par \par On physical examination, he appears in no acute distress.  His performance status is 1.  The HEENT examination is normal.  There are decreased breath sounds in the right base.  Heart examination reveals an irregular rhythm which is rate controlled.  There is no edema of the extremities.  There is no spinal column or chest wall tenderness to palpation or percussion.  The abdominal examination is normal.  The biopsy reports were reviewed.  He had an MRI of the pelvis in November 2021 demonstrating some abnormalities in the lung bases.  The liver was normal in size.  There were numerous cysts including a very large cyst measuring 7.4 x 4.8 cm.  There were no suspicious liver lesions.  There were multiple renal cysts as well including one on the right measuring 7.2 x 5.9 and on the left measuring 3.9 x 3.7 there were no suspicious renal masses.  The renal collecting system did not well opacified during the expiratory phase despite the use of Lasix.  There was wall thickening and hyperenhancement along the left posterior lateral bladder wall.  Hyperenhancement extending from the wall of the left ureter.  This was about 2 weeks after the transurethral resection and may reflect postoperative changes.\par \par I explained to his daughter that low-grade urothelial carcinoma is not in the domain of the oncologist.  These are managed by the urologist.  We reviewed the NCCN guidelines for nonmuscle invasive bladder cancer.  Under the low-grade category, there is a risk stratification of low risk, intermediate risk and high risk.  For intermediate risk patients, instillation of mitomycin is considered the preferred management.  High risk disease is determined by the amount of disease present and also multifocality.  It still is low grade carcinoma.  Surveillance is also an option, but is not the preferred option.  We discussed bladder cancer behavior, and explained to her about muscle invasive disease as well as or high-grade nonmuscle invasive cancer.  In the latter situation, Keytruda can be used for high-grade nonmuscle invasive bladder cancer, but that would not be an option in him given his underlying pulmonary issues.  Fortunately, he has low-grade disease, and he will continue on repeated cystoscopies.  There is no need for him to return to see me.\par \par Today's CBC revealed an improvement in his counts.  His white blood cell count was 6.63 with a hemoglobin value of 10.6 g.  The platelet count was 157,000.  Iron studies were normal.  The ferritin was 121.  A B12 and folate level were normal.  A serum protein electrophoresis is pending.  Light chains showed a predominance of kappa.  Both were elevated, and this may be secondary to his renal urgency.  His IgA , IgG, and IgM levels were normal.  Serum protein electrophoresis is pending\par \par All questions were answered to the best of my ability and to their apparent satisfaction.  There is no need for him to return and see me.  I will contact her in regards to the results of his anemia evaluation.  He should continue to follow with Dr. Rivas on a regular basis.

## 2022-06-28 NOTE — HISTORY OF PRESENT ILLNESS
[Disease: _____________________] : Disease: [unfilled] [T: ___] : T[unfilled] [AJCC Stage: ____] : AJCC Stage: [unfilled] [de-identified] : Wilder Wall is seen in consultation on June 28, 2022.  He was referred by Dr. Harris.  He was admitted to [facility] in September 2021.  The discharge summary is available as well as office notes from his pulmonologist.  He was admitted on September 7 and discharged on September 22.  He presented with cough and fever as well as progressive shortness of breath over a few weeks duration.  He had a history of TB which was treated in [place] 50 years ago, during college.  He had 2 admissions while in [place] for respiratory issues which were treated with steroids and IV immunoglobulin as per his daughter.  He has been living in [place] for 2 years.  He had a 2-week illness with fevers and chills associated with a dry cough.  The CT scan was markedly abnormal with bilateral nodular densities, more pronounced at the bases.  There was no hemoptysis.  He had no smoking history.  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  He was given ceftriaxone and azithromycin in the emergency room.  He was also found to have stage III chronic kidney disease, preesen for many years acc to daughter.   He has a history of hypertension as well.  He was placed on isolation during that admission and ruled out for recurrent tuberculosis.  He underwent a bronchoscopy and EBUS biopsy.  The biopsy revealed no evidence of malignancy but showed organizing pneumonia.  He remained hypoxic and required prolonged high-dose steroids.  He slowly improved clinically.  He had elevated glucose values due to the steroids.  He was treated with insulin and continues on it at this time, .  Once stable on nasal cannula, he was discharged home his creatinine on discharge was 1.7.  The hemoglobin was 9.5 g with normal indices.  The platelet count was normal.\par A CT scan without contrast was performed on September 17 he had worsening bilateral airspace opacity with air bronchograms in comparison to the prior scan.  That was performed on September 8.  There was a subcentimeter calcified nodule measuring 0.7 cm in the left apex.  A calcified granuloma was present in the left upper lobe measuring 0.6 cm.  There was no mediastinal hilar or axillary adenopathy.  A Doppler was performed on September 17 and revealed no evidence of thrombosis. he uses oxygen at night on some nights. \par \par He had hematuria during that admission.  He was seen by Dr. Benny Rivas and he underwent a cystoscopy.  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  He had a transurethral resection of the bladder tumor and a left ureteral stent placement.  He received 1 of 6 planned mitomycin treatments by instillation.  His daughter says his pneumonia flared after the mitomycin. He declined further mitomycin.  Repeat cystoscopy has been performed. He had a cystoscopy on May 9, 2022 as well as February 4 and November 11, 2021, and there was no evidence of recurrent tumor. Daughter says there was a small area of disease on most recent cysto and he has a repeat scheduled for 8/18/22. \par \par He is very tired at this time, present since the last flare of pneumonia. He has edema of the legs since January, worse towards the end of the day. No echo done. He is to see cardiologist next month in follow up. Appetite has been decreased, no recent weight loss, PCP said it was due to steroids, now only on 2.5 prednisone every other day. He was on high dose steroids since last September, gradually tapering, under care of Dr Pennington, next appt in July 7, 2022. Some cough, non productive, has cough when speaking. He has s to stop after 2 blocks due to SOB, has to rest for 30 seconds. Has fatigue, he often falls asleep. No N/V/D/C. Colonoscopy, EGD and capsule in March. CEA was elevated, has strong FH of colon cancer. No chest pain/pressure, but has palpitations with walking. No N/V/D, had constipation. NO gross hematuria at this time, flow is interrupted and somewhat weak. Nocturia x 2-3, occ incontinence, occ urgency, particularly for BMs. Has 2-3 BMs per day, soft and formed, no blood noted. Occ bruises.  Mostly independent in ADLs, has some need for assist in winter due to increased clothing.  [de-identified] : Low-grade papillary

## 2022-06-28 NOTE — PHYSICAL EXAM
[Restricted in physically strenuous activity but ambulatory and able to carry out work of a light or sedentary nature] : Status 1- Restricted in physically strenuous activity but ambulatory and able to carry out work of a light or sedentary nature, e.g., light house work, office work [Normal] : affect appropriate [de-identified] : decreased breath sounds right base [de-identified] : irregular, rate controlled.  [de-identified] : no edema

## 2022-06-28 NOTE — REASON FOR VISIT
[Initial Consultation] : an initial consultation [FreeTextEntry2] : bladder cancer, non muscle invasive.

## 2022-06-28 NOTE — REVIEW OF SYSTEMS
[Fatigue] : fatigue [Palpitations] : palpitations [Lower Ext Edema] : lower extremity edema [Cough] : cough [SOB on Exertion] : shortness of breath during exertion [Incontinence] : incontinence [Anxiety] : anxiety [Muscle Weakness] : muscle weakness [Negative] : Gastrointestinal [Fever] : no fever [Chills] : no chills [Recent Change In Weight] : ~T no recent weight change [Dysphagia] : no dysphagia [Loss of Hearing] : no loss of hearing [Nosebleeds] : no nosebleeds [Hoarseness] : no hoarseness [Odynophagia] : no odynophagia [Chest Pain] : no chest pain [Wheezing] : no wheezing [Dysuria] : no dysuria [Joint Pain] : no joint pain [Joint Stiffness] : no joint stiffness [Muscle Pain] : no muscle pain [Skin Rash] : no skin rash [Dizziness] : no dizziness [Difficulty Walking] : no difficulty walking [Depression] : no depression [Easy Bleeding] : no tendency for easy bleeding [Easy Bruising] : no tendency for easy bruising [FreeTextEntry4] : sore throat last week with URI [FreeTextEntry7] : GERD [FreeTextEntry9] : no cramps [de-identified] : No HA, has paresthesias of fingers and toes, present for one year

## 2022-06-29 LAB
DEPRECATED KAPPA LC FREE/LAMBDA SER: 1.35 RATIO
DEPRECATED KAPPA LC FREE/LAMBDA SER: 1.35 RATIO
FERRITIN SERPL-MCNC: 121 NG/ML
FOLATE SERPL-MCNC: >20 NG/ML
IGA SER QL IEP: 222 MG/DL
IGG SER QL IEP: 715 MG/DL
IGM SER QL IEP: 71 MG/DL
IRON SATN MFR SERPL: 20 %
IRON SERPL-MCNC: 53 UG/DL
KAPPA LC CSF-MCNC: 5.04 MG/DL
KAPPA LC CSF-MCNC: 5.04 MG/DL
KAPPA LC SERPL-MCNC: 6.78 MG/DL
KAPPA LC SERPL-MCNC: 6.78 MG/DL
TIBC SERPL-MCNC: 261 UG/DL
UIBC SERPL-MCNC: 208 UG/DL
VIT B12 SERPL-MCNC: 540 PG/ML

## 2022-07-05 LAB
ALBUMIN MFR SERPL ELPH: 56.1 %
ALBUMIN SERPL-MCNC: 3.3 G/DL
ALBUMIN/GLOB SERPL: 1.3 RATIO
ALPHA1 GLOB MFR SERPL ELPH: 5.6 %
ALPHA1 GLOB SERPL ELPH-MCNC: 0.3 G/DL
ALPHA2 GLOB MFR SERPL ELPH: 13.4 %
ALPHA2 GLOB SERPL ELPH-MCNC: 0.8 G/DL
B-GLOBULIN MFR SERPL ELPH: 12.7 %
B-GLOBULIN SERPL ELPH-MCNC: 0.7 G/DL
GAMMA GLOB FLD ELPH-MCNC: 0.7 G/DL
GAMMA GLOB MFR SERPL ELPH: 12.2 %
INTERPRETATION SERPL IEP-IMP: NORMAL
PROT SERPL-MCNC: 5.8 G/DL
PROT SERPL-MCNC: 5.8 G/DL

## 2022-08-17 DIAGNOSIS — D64.9 ANEMIA, UNSPECIFIED: ICD-10-CM

## 2022-08-18 ENCOUNTER — APPOINTMENT (OUTPATIENT)
Dept: UROLOGY | Facility: CLINIC | Age: 77
End: 2022-08-18

## 2022-08-18 ENCOUNTER — OUTPATIENT (OUTPATIENT)
Dept: OUTPATIENT SERVICES | Facility: HOSPITAL | Age: 77
LOS: 1 days | End: 2022-08-18
Payer: MEDICAID

## 2022-08-18 VITALS
DIASTOLIC BLOOD PRESSURE: 73 MMHG | RESPIRATION RATE: 17 BRPM | BODY MASS INDEX: 25.46 KG/M2 | HEART RATE: 71 BPM | HEIGHT: 68 IN | WEIGHT: 168 LBS | SYSTOLIC BLOOD PRESSURE: 167 MMHG

## 2022-08-18 DIAGNOSIS — Z98.890 OTHER SPECIFIED POSTPROCEDURAL STATES: Chronic | ICD-10-CM

## 2022-08-18 DIAGNOSIS — R35.0 FREQUENCY OF MICTURITION: ICD-10-CM

## 2022-08-18 DIAGNOSIS — Z90.49 ACQUIRED ABSENCE OF OTHER SPECIFIED PARTS OF DIGESTIVE TRACT: Chronic | ICD-10-CM

## 2022-08-18 PROCEDURE — 88112 CYTOPATH CELL ENHANCE TECH: CPT | Mod: 26

## 2022-08-18 PROCEDURE — 52000 CYSTOURETHROSCOPY: CPT

## 2022-08-20 LAB — URINE CYTOLOGY: NORMAL

## 2022-08-31 DIAGNOSIS — C67.0 MALIGNANT NEOPLASM OF TRIGONE OF BLADDER: ICD-10-CM

## 2022-12-15 ENCOUNTER — APPOINTMENT (OUTPATIENT)
Dept: UROLOGY | Facility: CLINIC | Age: 77
End: 2022-12-15

## 2022-12-15 ENCOUNTER — OUTPATIENT (OUTPATIENT)
Dept: OUTPATIENT SERVICES | Facility: HOSPITAL | Age: 77
LOS: 1 days | End: 2022-12-15
Payer: MEDICAID

## 2022-12-15 VITALS
HEART RATE: 60 BPM | SYSTOLIC BLOOD PRESSURE: 173 MMHG | DIASTOLIC BLOOD PRESSURE: 72 MMHG | RESPIRATION RATE: 16 BRPM | OXYGEN SATURATION: 98 %

## 2022-12-15 DIAGNOSIS — R35.0 FREQUENCY OF MICTURITION: ICD-10-CM

## 2022-12-15 DIAGNOSIS — Z98.890 OTHER SPECIFIED POSTPROCEDURAL STATES: Chronic | ICD-10-CM

## 2022-12-15 DIAGNOSIS — Z90.49 ACQUIRED ABSENCE OF OTHER SPECIFIED PARTS OF DIGESTIVE TRACT: Chronic | ICD-10-CM

## 2022-12-15 PROCEDURE — 52000 CYSTOURETHROSCOPY: CPT

## 2022-12-15 PROCEDURE — 88112 CYTOPATH CELL ENHANCE TECH: CPT | Mod: 26

## 2022-12-17 LAB — URINE CYTOLOGY: NORMAL

## 2022-12-20 DIAGNOSIS — C67.0 MALIGNANT NEOPLASM OF TRIGONE OF BLADDER: ICD-10-CM

## 2023-03-16 ENCOUNTER — APPOINTMENT (OUTPATIENT)
Dept: UROLOGY | Facility: CLINIC | Age: 78
End: 2023-03-16
Payer: MEDICAID

## 2023-03-16 ENCOUNTER — APPOINTMENT (OUTPATIENT)
Dept: UROLOGY | Facility: CLINIC | Age: 78
End: 2023-03-16

## 2023-03-16 ENCOUNTER — OUTPATIENT (OUTPATIENT)
Dept: OUTPATIENT SERVICES | Facility: HOSPITAL | Age: 78
LOS: 1 days | End: 2023-03-16
Payer: MEDICAID

## 2023-03-16 DIAGNOSIS — R35.0 FREQUENCY OF MICTURITION: ICD-10-CM

## 2023-03-16 DIAGNOSIS — Z98.890 OTHER SPECIFIED POSTPROCEDURAL STATES: Chronic | ICD-10-CM

## 2023-03-16 DIAGNOSIS — Z90.49 ACQUIRED ABSENCE OF OTHER SPECIFIED PARTS OF DIGESTIVE TRACT: Chronic | ICD-10-CM

## 2023-03-16 DIAGNOSIS — Z00.00 ENCOUNTER FOR GENERAL ADULT MEDICAL EXAMINATION W/OUT ABNORMAL FINDINGS: ICD-10-CM

## 2023-03-16 PROCEDURE — 52214 CYSTOSCOPY AND TREATMENT: CPT

## 2023-03-16 PROCEDURE — 88112 CYTOPATH CELL ENHANCE TECH: CPT | Mod: 26

## 2023-03-21 DIAGNOSIS — C67.0 MALIGNANT NEOPLASM OF TRIGONE OF BLADDER: ICD-10-CM

## 2023-03-28 LAB — URINE CYTOLOGY: NORMAL

## 2023-06-15 ENCOUNTER — APPOINTMENT (OUTPATIENT)
Dept: UROLOGY | Facility: CLINIC | Age: 78
End: 2023-06-15
Payer: MEDICAID

## 2023-06-15 ENCOUNTER — OUTPATIENT (OUTPATIENT)
Dept: OUTPATIENT SERVICES | Facility: HOSPITAL | Age: 78
LOS: 1 days | End: 2023-06-15
Payer: MEDICAID

## 2023-06-15 VITALS
SYSTOLIC BLOOD PRESSURE: 159 MMHG | HEIGHT: 68 IN | DIASTOLIC BLOOD PRESSURE: 66 MMHG | HEART RATE: 76 BPM | BODY MASS INDEX: 23.79 KG/M2 | RESPIRATION RATE: 17 BRPM | WEIGHT: 157 LBS

## 2023-06-15 DIAGNOSIS — Z98.890 OTHER SPECIFIED POSTPROCEDURAL STATES: Chronic | ICD-10-CM

## 2023-06-15 DIAGNOSIS — Z90.49 ACQUIRED ABSENCE OF OTHER SPECIFIED PARTS OF DIGESTIVE TRACT: Chronic | ICD-10-CM

## 2023-06-15 DIAGNOSIS — R35.0 FREQUENCY OF MICTURITION: ICD-10-CM

## 2023-06-15 PROCEDURE — 52000 CYSTOURETHROSCOPY: CPT

## 2023-06-15 PROCEDURE — 88112 CYTOPATH CELL ENHANCE TECH: CPT | Mod: 26

## 2023-06-16 DIAGNOSIS — C67.0 MALIGNANT NEOPLASM OF TRIGONE OF BLADDER: ICD-10-CM

## 2023-06-16 DIAGNOSIS — R31.0 GROSS HEMATURIA: ICD-10-CM

## 2023-06-16 DIAGNOSIS — E11.9 TYPE 2 DIABETES MELLITUS WITHOUT COMPLICATIONS: ICD-10-CM

## 2023-06-17 LAB — URINE CYTOLOGY: NORMAL

## 2023-09-14 ENCOUNTER — OUTPATIENT (OUTPATIENT)
Dept: OUTPATIENT SERVICES | Facility: HOSPITAL | Age: 78
LOS: 1 days | End: 2023-09-14
Payer: MEDICAID

## 2023-09-14 ENCOUNTER — APPOINTMENT (OUTPATIENT)
Dept: UROLOGY | Facility: CLINIC | Age: 78
End: 2023-09-14
Payer: MEDICAID

## 2023-09-14 VITALS — OXYGEN SATURATION: 100 % | SYSTOLIC BLOOD PRESSURE: 178 MMHG | DIASTOLIC BLOOD PRESSURE: 66 MMHG | HEART RATE: 67 BPM

## 2023-09-14 DIAGNOSIS — E11.9 TYPE 2 DIABETES MELLITUS W/OUT COMPLICATIONS: ICD-10-CM

## 2023-09-14 DIAGNOSIS — Z90.49 ACQUIRED ABSENCE OF OTHER SPECIFIED PARTS OF DIGESTIVE TRACT: Chronic | ICD-10-CM

## 2023-09-14 DIAGNOSIS — Z98.890 OTHER SPECIFIED POSTPROCEDURAL STATES: Chronic | ICD-10-CM

## 2023-09-14 DIAGNOSIS — R35.0 FREQUENCY OF MICTURITION: ICD-10-CM

## 2023-09-14 DIAGNOSIS — R31.0 GROSS HEMATURIA: ICD-10-CM

## 2023-09-14 DIAGNOSIS — Z87.01 PERSONAL HISTORY OF PNEUMONIA (RECURRENT): ICD-10-CM

## 2023-09-14 PROCEDURE — 52000 CYSTOURETHROSCOPY: CPT

## 2023-09-15 DIAGNOSIS — R31.0 GROSS HEMATURIA: ICD-10-CM

## 2023-09-15 DIAGNOSIS — Z87.01 PERSONAL HISTORY OF PNEUMONIA (RECURRENT): ICD-10-CM

## 2023-09-15 DIAGNOSIS — E11.9 TYPE 2 DIABETES MELLITUS WITHOUT COMPLICATIONS: ICD-10-CM

## 2023-09-17 LAB — URINE CYTOLOGY: NORMAL

## 2024-03-13 DIAGNOSIS — C67.0 MALIGNANT NEOPLASM OF TRIGONE OF BLADDER: ICD-10-CM

## 2024-03-14 ENCOUNTER — APPOINTMENT (OUTPATIENT)
Dept: UROLOGY | Facility: CLINIC | Age: 79
End: 2024-03-14
Payer: MEDICAID

## 2024-03-14 ENCOUNTER — OUTPATIENT (OUTPATIENT)
Dept: OUTPATIENT SERVICES | Facility: HOSPITAL | Age: 79
LOS: 1 days | End: 2024-03-14
Payer: MEDICAID

## 2024-03-14 ENCOUNTER — APPOINTMENT (OUTPATIENT)
Dept: UROLOGY | Facility: CLINIC | Age: 79
End: 2024-03-14

## 2024-03-14 VITALS
DIASTOLIC BLOOD PRESSURE: 71 MMHG | RESPIRATION RATE: 15 BRPM | TEMPERATURE: 98 F | SYSTOLIC BLOOD PRESSURE: 156 MMHG | HEART RATE: 72 BPM

## 2024-03-14 DIAGNOSIS — R35.0 FREQUENCY OF MICTURITION: ICD-10-CM

## 2024-03-14 DIAGNOSIS — Z90.49 ACQUIRED ABSENCE OF OTHER SPECIFIED PARTS OF DIGESTIVE TRACT: Chronic | ICD-10-CM

## 2024-03-14 DIAGNOSIS — Z98.890 OTHER SPECIFIED POSTPROCEDURAL STATES: Chronic | ICD-10-CM

## 2024-03-14 PROCEDURE — 52000 CYSTOURETHROSCOPY: CPT

## 2024-03-15 DIAGNOSIS — C67.0 MALIGNANT NEOPLASM OF TRIGONE OF BLADDER: ICD-10-CM

## 2024-03-16 LAB — URINE CYTOLOGY: NORMAL
